# Patient Record
Sex: MALE | Race: OTHER | HISPANIC OR LATINO | URBAN - METROPOLITAN AREA
[De-identification: names, ages, dates, MRNs, and addresses within clinical notes are randomized per-mention and may not be internally consistent; named-entity substitution may affect disease eponyms.]

---

## 2024-05-09 ENCOUNTER — OUTPATIENT (OUTPATIENT)
Dept: OUTPATIENT SERVICES | Facility: HOSPITAL | Age: 29
LOS: 1 days | End: 2024-05-09
Payer: SELF-PAY

## 2024-05-09 ENCOUNTER — EMERGENCY (EMERGENCY)
Facility: HOSPITAL | Age: 29
LOS: 0 days | Discharge: HOME | End: 2024-05-09
Attending: EMERGENCY MEDICINE
Payer: SELF-PAY

## 2024-05-09 VITALS
OXYGEN SATURATION: 100 % | DIASTOLIC BLOOD PRESSURE: 82 MMHG | RESPIRATION RATE: 16 BRPM | SYSTOLIC BLOOD PRESSURE: 140 MMHG | TEMPERATURE: 99 F | HEART RATE: 85 BPM

## 2024-05-09 DIAGNOSIS — K02.9 DENTAL CARIES, UNSPECIFIED: ICD-10-CM

## 2024-05-09 DIAGNOSIS — K03.81 CRACKED TOOTH: ICD-10-CM

## 2024-05-09 DIAGNOSIS — K08.89 OTHER SPECIFIED DISORDERS OF TEETH AND SUPPORTING STRUCTURES: ICD-10-CM

## 2024-05-09 PROCEDURE — 99283 EMERGENCY DEPT VISIT LOW MDM: CPT

## 2024-05-09 PROCEDURE — D0220: CPT

## 2024-05-09 PROCEDURE — D0140: CPT

## 2024-05-09 RX ORDER — AMOXICILLIN 250 MG/5ML
1 SUSPENSION, RECONSTITUTED, ORAL (ML) ORAL
Qty: 14 | Refills: 0
Start: 2024-05-09 | End: 2024-05-15

## 2024-05-09 NOTE — ED PROVIDER NOTE - NSFOLLOWUPINSTRUCTIONS_ED_ALL_ED_FT
FOLLOW UP IN DENTAL CLINIC TODAY    Dental Pain    Dental pain is often a sign that something is wrong with your teeth or gums. It is also something that can occur following dental treatment. If you have dental pain, it is important to contact your dental care provider, especially if the cause of the pain has not been determined. Dental pain may be of varying intensity and can be caused by many things, including:  Tooth decay (cavities or caries). Cavities are caused by bacteria that produce acids that irritate the nerve of your tooth, making it sensitive to air and hot or cold temperatures. This eventually causes discomfort or pain.  Abscess or infection. Once the bacteria reach the inner part of the tooth (pulp), a bacterial infection (dental abscess) can occur. Pus typically collects at the end of the root of a tooth.  Injury.  A crack in the tooth.  Gum recession exposing the root, and possibly the nerves, of a tooth.  Gum (periodontal)disease.  Abnormal grinding or clenching.  Poor or improper home care.  An unknown reason (idiopathic).  Your pain may be mild or severe. It may occur when you are:  Chewing.  Exposed to hot or cold temperatures.  Eating or drinking sugary foods or beverages, such as soda or candy.  Your pain may be constant, or it may come and go without cause.    Follow these instructions at home:  The following actions may help to lessen any discomfort that you are feeling before or after getting dental care.    Medicines    Take over-the-counter and prescription medicines only as told by your dental care provider.  If you were prescribed an antibiotic medicine, take it as told by your dental care provider. Do not stop taking the antibiotic even if you start to feel better.  Eating and drinking    Avoid foods or drinks that cause you pain, such as:  Very hot or very cold foods or drinks.  Sweet or sugary foods or drinks.  Managing pain and swelling      Ice can sometimes be used to reduce pain and swelling, especially if the pain is following dental treatment.  If directed, put ice on the painful area of your face. To do this:  Put ice in a plastic bag.  Place a towel between your skin and the bag.  Leave the ice on for 20 minutes, 2–3 times a day.  Remove the ice if your skin turns bright red. This is very important. If you cannot feel pain, heat, or cold, you have a greater risk of damage to the area.  Brushing your teeth    To keep your mouth and gums healthy, brush your teeth twice a day using a fluoride toothpaste.  Use a toothpaste made for sensitive teeth as directed by your dental care provider, especially if the root is exposed.  Always brush your teeth with a soft-bristled toothbrush. This will help prevent irritation to your gums.  General instructions    Floss at least once a day.  Do not apply heat to the outside of the face.  Gargle with a mixture of salt and water 3–4 times a day or as needed. To make salt water, completely dissolve ½–1 tsp (3–6 g) of salt in 1 cup (237 mL) of warm water.  Keep all follow-up visits. This is important.  Contact a dental care provider if:  You have any unexplained dental pain.  Your pain is not controlled with medicines.  Your symptoms get worse.  You have new symptoms.  Get help right away if:  You are unable to open your mouth.  You are having trouble breathing or swallowing.  You have a fever.  You notice that your face, neck, or jaw is swollen.  These symptoms may represent a serious problem that is an emergency. Do not wait to see if the symptoms will go away. Get medical help right away. Call your local emergency services (911 in the [place] Do not drive yourself to the hospital.    Summary  Dental pain may be caused by many things, including tooth decay and infection.  Your pain may be mild or severe.  Take over-the-counter and prescription medicines only as told by your dental care provider.  Watch your dental pain for any changes. Let your dental care provider know if your symptoms get worse.

## 2024-05-09 NOTE — ED PROVIDER NOTE - PHYSICAL EXAMINATION
VSS, awake, alert, clear speech, steady gait.      EOE:  TMJ ( - ) clicks, ( - ) pops, ( - ) crepitus, Mandible <<FROM>>, Facial bones and MOM <<grossly intact>>, ( - ) trismus, ( - ) lymphadenopathy, ( - ) swelling, ( - ) asymmetry, ( - ) tenderness to palpation, ( - ) dyspnea, ( - ) dysphagia,       IOE: tongue <<No pathology noted>>, labial/buccal mucosa <<No pathology noted>>, ( +) percussion, ( +) palpation, ( - ) swelling, ( - ) abscess, ( - ) sinus tract appreciated.    Dentition present: <<  grossly intact >>, Caries: << + >>  , + cracked tooth.

## 2024-05-09 NOTE — ED PROVIDER NOTE - CLINICAL SUMMARY MEDICAL DECISION MAKING FREE TEXT BOX
Patient presents for dental pain due to suspected dental sangita. Patient not immunosuppressed, afebrile and well appearing with patent airway, have low suspicion for deep space infection or any concern for airway compromise. Based on history, physical, and work up. No evidence of tooth fracture, avulsion, or bleeding socket. No evidence of RPA, PTA, Adrian’s angina, periapical abscess. Instructed patient to continue to treat pain with ibuprofen/acetaminophen until they see a dentist. ABX for dental pain. Patient discharged home and will follow up with dentist. Discussed return precautions for odontogenic infections and other dental pain emergencies.

## 2024-05-09 NOTE — ED PROVIDER NOTE - OBJECTIVE STATEMENT
28-year-old male denies significant PMH presents with tooth ache for several weeks, chipped tooth several days ago which exacerbated the pain, denies fever, chills, facial swelling, sore throat, dysphagia, odynophagia, hoarseness, or other associated complaints at present. No old chart available for review. I have reviewed and agree with the initial nursing note, except as documented in my note.

## 2024-05-09 NOTE — ED PROVIDER NOTE - PATIENT PORTAL LINK FT
You can access the FollowMyHealth Patient Portal offered by Health system by registering at the following website: [email or web address]. By joining [facility]’s FollowMyHealth portal, you will also be able to view your health information using other applications (apps) compatible with our system.

## 2024-05-10 DIAGNOSIS — K02.9 DENTAL CARIES, UNSPECIFIED: ICD-10-CM
